# Patient Record
Sex: FEMALE | Race: WHITE
[De-identification: names, ages, dates, MRNs, and addresses within clinical notes are randomized per-mention and may not be internally consistent; named-entity substitution may affect disease eponyms.]

---

## 2017-11-20 ENCOUNTER — HOSPITAL ENCOUNTER (OUTPATIENT)
Dept: HOSPITAL 19 - COL.LAB | Age: 52
End: 2017-11-20
Attending: ORTHOPAEDIC SURGERY
Payer: OTHER GOVERNMENT

## 2017-11-20 DIAGNOSIS — Z01.812: Primary | ICD-10-CM

## 2020-02-17 ENCOUNTER — HOSPITAL ENCOUNTER (OUTPATIENT)
Dept: HOSPITAL 19 - COL.RAD | Age: 55
End: 2020-02-17
Payer: OTHER GOVERNMENT

## 2020-02-17 DIAGNOSIS — M48.56XA: Primary | ICD-10-CM

## 2020-06-27 ENCOUNTER — HOSPITAL ENCOUNTER (OUTPATIENT)
Dept: HOSPITAL 19 - MEDICAL | Age: 55
Setting detail: OBSERVATION
LOS: 1 days | Discharge: HOME | End: 2020-06-28
Attending: FAMILY MEDICINE | Admitting: FAMILY MEDICINE
Payer: OTHER GOVERNMENT

## 2020-06-27 VITALS — DIASTOLIC BLOOD PRESSURE: 63 MMHG | SYSTOLIC BLOOD PRESSURE: 102 MMHG | HEART RATE: 108 BPM | TEMPERATURE: 97.6 F

## 2020-06-27 VITALS — SYSTOLIC BLOOD PRESSURE: 95 MMHG | DIASTOLIC BLOOD PRESSURE: 57 MMHG | HEART RATE: 102 BPM | TEMPERATURE: 98.3 F

## 2020-06-27 VITALS — TEMPERATURE: 98.7 F | DIASTOLIC BLOOD PRESSURE: 58 MMHG | SYSTOLIC BLOOD PRESSURE: 106 MMHG | HEART RATE: 104 BPM

## 2020-06-27 VITALS — TEMPERATURE: 98.3 F | SYSTOLIC BLOOD PRESSURE: 95 MMHG | DIASTOLIC BLOOD PRESSURE: 65 MMHG | HEART RATE: 108 BPM

## 2020-06-27 VITALS — WEIGHT: 181.44 LBS | BODY MASS INDEX: 26.87 KG/M2 | HEIGHT: 69 IN

## 2020-06-27 VITALS — TEMPERATURE: 98.3 F | DIASTOLIC BLOOD PRESSURE: 66 MMHG | SYSTOLIC BLOOD PRESSURE: 109 MMHG | HEART RATE: 97 BPM

## 2020-06-27 VITALS — SYSTOLIC BLOOD PRESSURE: 106 MMHG | HEART RATE: 104 BPM | TEMPERATURE: 98.7 F | DIASTOLIC BLOOD PRESSURE: 58 MMHG

## 2020-06-27 DIAGNOSIS — Z88.3: ICD-10-CM

## 2020-06-27 DIAGNOSIS — M81.0: ICD-10-CM

## 2020-06-27 DIAGNOSIS — N17.9: ICD-10-CM

## 2020-06-27 DIAGNOSIS — Z79.899: ICD-10-CM

## 2020-06-27 DIAGNOSIS — Z80.9: ICD-10-CM

## 2020-06-27 DIAGNOSIS — G40.909: ICD-10-CM

## 2020-06-27 DIAGNOSIS — D50.0: ICD-10-CM

## 2020-06-27 DIAGNOSIS — R73.9: ICD-10-CM

## 2020-06-27 DIAGNOSIS — K21.9: ICD-10-CM

## 2020-06-27 DIAGNOSIS — F32.9: ICD-10-CM

## 2020-06-27 DIAGNOSIS — D69.6: ICD-10-CM

## 2020-06-27 DIAGNOSIS — Z88.2: ICD-10-CM

## 2020-06-27 DIAGNOSIS — F17.210: ICD-10-CM

## 2020-06-27 DIAGNOSIS — K25.4: Primary | ICD-10-CM

## 2020-06-27 DIAGNOSIS — G62.9: ICD-10-CM

## 2020-06-27 LAB
ANION GAP SERPL CALC-SCNC: 11 MMOL/L (ref 7–16)
BASOPHILS # BLD: 0 10*3/UL (ref 0–0.2)
BASOPHILS NFR BLD AUTO: 0.4 % (ref 0–2)
BUN SERPL-MCNC: 55 MG/DL (ref 7–17)
CALCIUM SERPL-MCNC: 8.9 MG/DL (ref 8.4–10.2)
CHLORIDE SERPL-SCNC: 105 MMOL/L (ref 98–107)
CO2 SERPL-SCNC: 21 MMOL/L (ref 22–30)
CREAT SERPL-SCNC: 1.13 UMOL/L (ref 0.52–1.25)
EOSINOPHIL # BLD: 0 10*3/UL (ref 0–0.7)
EOSINOPHIL NFR BLD: 0.3 % (ref 0–4)
ERYTHROCYTE [DISTWIDTH] IN BLOOD BY AUTOMATED COUNT: 12.4 % (ref 11.5–14.5)
GLUCOSE SERPL-MCNC: 106 MG/DL (ref 74–106)
GRANULOCYTES # BLD AUTO: 66.5 % (ref 42.2–75.2)
HCT VFR BLD AUTO: 23.9 % (ref 37–47)
HCT VFR BLD AUTO: 24.7 % (ref 37–47)
HCT VFR BLD AUTO: 27.3 % (ref 37–47)
HGB BLD-MCNC: 7.7 G/DL (ref 12.5–16)
HGB BLD-MCNC: 8 G/DL (ref 12.5–16)
HGB BLD-MCNC: 9 G/DL (ref 12.5–16)
IRON SERPL-MCNC: 77 UG/DL (ref 35–150)
LYMPHOCYTES # BLD: 2.4 10*3/UL (ref 1.2–3.4)
LYMPHOCYTES NFR BLD: 25.7 % (ref 20–51)
MCH RBC QN AUTO: 36 PG (ref 27–31)
MCHC RBC AUTO-ENTMCNC: 33 G/DL (ref 33–37)
MCV RBC AUTO: 111 FL (ref 80–100)
MONOCYTES # BLD: 0.6 10*3/UL (ref 0.1–0.6)
MONOCYTES NFR BLD AUTO: 6.4 % (ref 1.7–9.3)
NEUTROPHILS # BLD: 6.2 10*3/UL (ref 1.4–6.5)
PLATELET # BLD AUTO: 108 K/MM3 (ref 130–400)
PMV BLD AUTO: 12.8 FL (ref 7.4–10.4)
POTASSIUM SERPL-SCNC: 3.6 MMOL/L (ref 3.4–5)
RBC # BLD AUTO: 2.47 M/MM3 (ref 4.1–5.3)
SODIUM SERPL-SCNC: 137 MMOL/L (ref 137–145)
TIBC SERPL-MCNC: 258 UG/DL (ref 265–497)

## 2020-06-27 PROCEDURE — C9113 INJ PANTOPRAZOLE SODIUM, VIA: HCPCS

## 2020-06-27 PROCEDURE — G0378 HOSPITAL OBSERVATION PER HR: HCPCS

## 2020-06-27 PROCEDURE — G0379 DIRECT REFER HOSPITAL OBSERV: HCPCS

## 2020-06-27 NOTE — NUR
Received report from JONAH Fowler. A/Ox4. Indepedent in room. Pt had large
loose BM and was able to clean self with some assistance. c/o headache,
prn pain meds administered by day RN. meds administered as ordered. Tele
monitor in place.  IV to RAC intact with fluids infusing, dressing CDI. Needs
met at this time.  Call light wtihin reach.

## 2020-06-27 NOTE — NUR
Assessment completed, alert/oriented, vital signs stable, denies pain, I have
not observed any stool or emesis, hemaglobin stable at 9.0 this morning, she
is NPO and scheduled for EGD  around 1000 this morning, consent is signed,
patient denies other needs at this time

## 2020-06-27 NOTE — NUR
Patient arrived back to room 358 from Endo at this time, she is
alert/oriented, vital signs stable, will continue to monitor

## 2020-06-27 NOTE — NUR
Plan: Unknown, May go home with  Agus [phone].
Assessment: SW met with patient about DC. Patient reports that she resides in
 with her Spouse Agus and her dog. Patient shares that she has a CPAP
machine but does not use it. Patient reports that she was tested for COvid and
was negative. Patient shares that she thinks she is dehydrated. Patient
reports that her PCP is Dr. Kate Butt @ [facility]. Patient
indicated that she also obtains medications from there also. Patient reports
that she uses a walker and cane only when out and about. Patient declined home
health resources. Patient reports concerns of weakness in legs and wanting PT
options.
Action: SW educated on home health services amd supports available to her. Dave
continue to follow care.

## 2020-06-27 NOTE — NUR
PATIENT WAS BROUGHT TO THE FACILITY FROM [facility]. PATIENT IS ALERT AND
ORIENTATED X4. PATIENT REPORTED THAT SHE HAD SOME BLACK TARRY STOOLS AND THEN
COFFEE GROUND EMESIS TODAY. PATIENT HAS IV FLUIDS INFUSING IN HER RIGHT AC IV
SITE THAT HAS WONDERFUL BLOOD RETURN. PATIENT IS NPO AT THIS TIME AND ON Q6
ACCU CHECKS. PATIENT IS ON SEIZURE PRECAUTIONS. SHE IS ON TELEY AND IS SINUS
TACHY. PATIENT DID HAVE A BOWEL MOVEMENT AND DID URINATE AT THAT TIME. NO
SAMPLES COULD BE OBTAINED BECAUSE OF CROSS CONTAIMINATION. PATIENT DID HAVE A
COVID SCREEN DONE AT [facility] THAT WAS NEGATIVE. PATIENT WAS CLEANED UP AND PUT
ON A BREIF. WILL CONTINUE TO MONITOR. WILL REPORT OFF TO DAY SHIFT. A MAURICIO
WAS NOTIFIED THAT THE PATIENT WAS ON THE FLOOR.

## 2020-06-28 VITALS — SYSTOLIC BLOOD PRESSURE: 105 MMHG | HEART RATE: 103 BPM | DIASTOLIC BLOOD PRESSURE: 66 MMHG | TEMPERATURE: 98.4 F

## 2020-06-28 VITALS — DIASTOLIC BLOOD PRESSURE: 71 MMHG | HEART RATE: 99 BPM | TEMPERATURE: 98.1 F | SYSTOLIC BLOOD PRESSURE: 116 MMHG

## 2020-06-28 LAB
ANION GAP SERPL CALC-SCNC: 6 MMOL/L (ref 7–16)
BUN SERPL-MCNC: 22 MG/DL (ref 7–17)
CALCIUM SERPL-MCNC: 8.8 MG/DL (ref 8.4–10.2)
CHLORIDE SERPL-SCNC: 109 MMOL/L (ref 98–107)
CO2 SERPL-SCNC: 23 MMOL/L (ref 22–30)
CREAT SERPL-SCNC: 0.91 UMOL/L (ref 0.52–1.25)
EOSINOPHIL NFR BLD: 2 % (ref 0–4)
ERYTHROCYTE [DISTWIDTH] IN BLOOD BY AUTOMATED COUNT: 12.5 % (ref 11.5–14.5)
GLUCOSE SERPL-MCNC: 121 MG/DL (ref 74–106)
HCT VFR BLD AUTO: 22.8 % (ref 37–47)
HGB BLD-MCNC: 7.4 G/DL (ref 12.5–16)
LYMPHOCYTES NFR BLD MANUAL: 34 % (ref 20–51)
MACROCYTES BLD QL SMEAR: (no result)
MCH RBC QN AUTO: 37 PG (ref 27–31)
MCHC RBC AUTO-ENTMCNC: 33 G/DL (ref 33–37)
MCV RBC AUTO: 115 FL (ref 80–100)
MONOCYTES NFR BLD: 5 % (ref 1.7–9.3)
NEUTS BAND NFR BLD: 1 % (ref 0–10)
NEUTS SEG NFR BLD MANUAL: 58 % (ref 42–75.2)
PLATELET # BLD AUTO: 91 K/MM3 (ref 130–400)
PLATELET BLD QL SMEAR: (no result)
PMV BLD AUTO: 12.3 FL (ref 7.4–10.4)
POTASSIUM SERPL-SCNC: 3.5 MMOL/L (ref 3.4–5)
RBC # BLD AUTO: 1.99 M/MM3 (ref 4.1–5.3)
SODIUM SERPL-SCNC: 137 MMOL/L (ref 137–145)

## 2020-06-28 NOTE — NUR
PT AOX4. REPORTS CHRONIC LOWER BACK PAIN 7/10 WITH OCCASIONAL KEVIN LEG SPASMS.
TRMADOL GIVEN. REPORTS LOOSE STOOLS 1 LARGE OVERNIGHT. DENIES CRAMPING, N/V.

## 2020-06-30 LAB — FOLATE (FOLIC ACID): 16.2 NG/ML (ref 4–?)

## 2021-04-01 ENCOUNTER — HOSPITAL ENCOUNTER (OUTPATIENT)
Dept: HOSPITAL 19 - COL.RAD | Age: 56
End: 2021-04-01
Payer: OTHER GOVERNMENT

## 2021-04-01 DIAGNOSIS — S32.010A: Primary | ICD-10-CM
